# Patient Record
Sex: FEMALE | Race: BLACK OR AFRICAN AMERICAN | Employment: OTHER | URBAN - METROPOLITAN AREA
[De-identification: names, ages, dates, MRNs, and addresses within clinical notes are randomized per-mention and may not be internally consistent; named-entity substitution may affect disease eponyms.]

---

## 2017-12-28 ENCOUNTER — HOSPITAL ENCOUNTER (OUTPATIENT)
Dept: PHYSICAL THERAPY | Age: 67
Discharge: HOME | End: 2017-12-31

## 2017-12-28 ASSESSMENT — PAIN DESCRIPTION - PAIN TYPE: TYPE: ACUTE PAIN

## 2017-12-28 ASSESSMENT — PAIN DESCRIPTION - DESCRIPTORS: DESCRIPTORS: BURNING;ACHING;SHARP;SHOOTING

## 2017-12-28 ASSESSMENT — PAIN DESCRIPTION - LOCATION: LOCATION: HIP

## 2017-12-28 ASSESSMENT — PAIN DESCRIPTION - FREQUENCY: FREQUENCY: INTERMITTENT

## 2017-12-28 ASSESSMENT — PAIN SCALES - GENERAL: PAINLEVEL_OUTOF10: 9

## 2017-12-28 ASSESSMENT — PAIN DESCRIPTION - ORIENTATION: ORIENTATION: RIGHT

## 2017-12-28 NOTE — PROGRESS NOTES
Physical Therapy  Initial Assessment  Date: 2017  Patient Name: Tomeka Lo  MRN: [id number]  : 1950      Diagnosis: ITB Syndrome M76.31  Treatment Diagnosis: Reduced hip abd strength/stability RLE, ITB irritation, increased pain with prolonged standing     Restrictions    Outpatient fall risk assessment completed asking screening question if patient has fallen in the past 30 days:  [x] Yes  [] No    Based on screen for falls, patient demonstrates fall risk:  [] Yes  [x] No    Interventions based on fall risk status:  Updated Problem List within Medical History  [] Yes   [x] N/A    Asked family to assist with increased observation of the patient  [] Yes   [x] N/A    Patient kept in visible area when not closely supervised by therapist  [] Yes   [x] N/A    Repeatedly reinforce activity limits and safety needs with patient/family  [] Yes   [x] N/A    Increase frequency of rounding/monitoring patient  [] Yes   [x] N/A        Subjective   General  Chart Reviewed: Yes  Patient assessed for rehabilitation services?: Yes  Family / Caregiver Present: No  Referring Practitioner: Kimberlee Du  Referral Date : 17  Diagnosis: R ITB Syndrome m76.31  PT Visit Information  Onset Date: 17  PT Insurance Information: [employer] $45 copay, 60 visits, year, PRECERT required   Subjective  Subjective: CLOF: Pt has an episode with outer right leg pain for 6 month duration. Pt reports that she can't stand for long periods of time (approx 2 hours) to bake cookies, go to mall, etc, and she starts getting sharp, stabbing pains that requires her to sit down and rest for 15 mins, then she can stand for another approx 2 hours. Also gets pain with prolonged sitting. Rates pain at a 9/10 and describes pain as burning, sharp, shooting, deep, and pins and needles. During appt with Dr Joey Rader, she received NSAID's, and might receive a steroid injection. F/U with Dr Joey Rader next week.   Pt enjoys sewing and irritation, increased pain with prolonged standing   Prognosis: Good  Decision Making: Low Complexity    Plan   Plan  Times per week: 2  Times per day: Daily  Plan weeks: 6   Current Treatment Recommendations: ROM, Strengthening, Balance Training, Home Exercise Program, Manual Therapy - Soft Tissue Mobilization, Modalities, Patient/Caregiver Education & Training    G-Code  PT G-Codes  Functional Assessment Tool Used: PT Assessment   Score: 20-40% impaired   Functional Limitation: Mobility: Walking and moving around  Mobility: Walking and Moving Around Current Status ():  At least 20 percent but less than 40 percent impaired, limited or restricted  Mobility: Walking and Moving Around Goal Status (): 0 percent impaired, limited or restricted    OutComes Score  20-40% limited   Goals  Long term goals  Time Frame for Long term goals : 6  Long term goal 1: Pt will increase right hip abd/ext strength to 4+/5 to allow patient to improve tolerance to functional activities in home and resume all gym activities  Long term goal 2: Pt will reduce pain to <= 4/10 with prolonged standing > 2 hours to allow pt to go shopping at mall and do kitchen duties in home without difficulty   Long term goal 3: Pt will tolerate being able to stand for > 2 hours at a time without difficulty in order to maximize return to PLOF   Long term goal 4: Pt will be IND with progressive LE debility HEP program, including [facility], in order to optimize return to maximal functional mobility and independence  Patient Goals   Patient goals : Reduce pain, learn exercises to safely do to maintain        Therapy Time   Individual Concurrent Group Co-treatment   Time In  1600         Time Out  1652         Minutes  [address] [place] [address] Se, PT

## 2017-12-28 NOTE — FLOWSHEET NOTE
and seated Hip IR with green Tb. Issued green TB to patient     Manual Treatments:      Modalities:      Timed Code Treatment Minutes:  28    Total Treatment Minutes:  52    Treatment/Activity Tolerance:  [x] Patient tolerated treatment well [] Patient limited by fatigue  [] Patient limited by pain  [] Patient limited by other medical complications  [] Other:     Prognosis: [x] Good [] Fair  [] Poor    Patient Requires Follow-up: [x] Yes  [] No    Plan:   [] Continue per plan of care [] Alter current plan (see comments)  [x] Plan of care initiated [] Hold pending MD visit [] Discharge  Plan for Next Session:   ITB/hip ABD strengthening and flexibility.   Ultrasound/massage if appropriate     Electronically signed by:  Belgica Pedraza PT

## 2018-01-01 ENCOUNTER — HOSPITAL ENCOUNTER (OUTPATIENT)
Dept: OTHER | Age: 68
Discharge: HOME | End: 2018-01-31

## 2018-02-01 ENCOUNTER — HOSPITAL ENCOUNTER (OUTPATIENT)
Dept: OTHER | Age: 68
Discharge: HOME | End: 2018-02-28

## 2020-05-11 ENCOUNTER — OFFICE VISIT (OUTPATIENT)
Dept: ORTHOPEDIC SURGERY | Age: 70
End: 2020-05-11
Payer: COMMERCIAL

## 2020-05-11 VITALS — TEMPERATURE: 97.1 F

## 2020-05-11 PROCEDURE — 99202 OFFICE O/P NEW SF 15 MIN: CPT | Performed by: PHYSICIAN ASSISTANT

## 2020-05-11 PROCEDURE — 20610 DRAIN/INJ JOINT/BURSA W/O US: CPT | Performed by: PHYSICIAN ASSISTANT

## 2020-05-11 NOTE — PROGRESS NOTES
Subjective:      Patient ID: Maren Watts is a 78 y.o.  female. Chief Complaint   Patient presents with    Knee Problem     Left knee        HPI: She is here for evaluation and treatment of left knee pain related to arthritis. She states the previous injection which was performed 5 years ago  helped relieve the knee symptoms. The knee pain has gradually returned. There has not been a recent injury. Pain is 8/10. Pain is worse with activity. Pain improves somewhat with rest and elevation. It has been over a year since x-rays and clinical exam has been performed for knee arthritic complaints. Review of Systems:   Negative for fever or chills. Negative for numbness or tingling around the knee. Past Medical History:   Diagnosis Date    Anxiety     Baker cyst     Edema     Hyperlipidemia     Hypertension     Osteoarthritis     Osteopenia     Post herpetic neuralgia 2/25/2013    Thrush     Vitamin D deficiency        History reviewed. No pertinent family history. History reviewed. No pertinent surgical history.     Social History     Occupational History    Not on file   Tobacco Use    Smoking status: Never Smoker    Smokeless tobacco: Never Used   Substance and Sexual Activity    Alcohol use: No    Drug use: No    Sexual activity: Not on file       Current Outpatient Medications   Medication Sig Dispense Refill    hydrOXYzine (ATARAX) 25 MG tablet Take 1 tablet by mouth 2 times daily 60 tablet 2    vitamin D (ERGOCALCIFEROL) 11101 UNITS CAPS capsule Take 1 capsule by mouth once a week 4 capsule 2    amLODIPine (NORVASC) 5 MG tablet Take 1 tablet by mouth daily 30 tablet 2    potassium chloride SA (K-DUR;KLOR-CON M) 20 MEQ tablet Take 1 tablet by mouth daily 30 tablet 2    atenolol-chlorthalidone (TENORETIC) 100-25 MG per tablet Take 1 tablet by mouth daily 30 tablet 2    cloNIDine (CATAPRES) 0.1 MG tablet Take 1 tablet by mouth 2 times daily 60 tablet 2    Calcium the pain related to the arthritic knee condition. And when the injections fail to control the symptoms then surgical correction should be considered. Risk and benefits of corticosteroid intra-articular injection was discussed today. All questions were answered to her satisfaction. She verbally consented to proceed with intra-articular injection today. Cortisone Injection                                                       PROCEDURE NOTE:     Pre op Diagnosis: Knee pain/ DJD left Knee     Post op Diagnosis: Same  With the patient's permission, her left knee was prepped  in standard sterile fashion with  Alcohol and 2 cc of 0.25% Marcaine and 1 cc of Kenalog 40 mg was injected into the left lateral compartment  without difficulty. The patient tolerated this well without difficulty. A band-aid was applied. The patient was advised to ice the knee for 15-20 minutes to relieve any injection site related pain. Continue with medications as previously prescribed. If diabetic, observe blood sugars for the next 24 to 48 hours. Contact PCP if they remain elevated. HEP instructed for Knee ROM  and Quad strengthening exercises. Activities as tolerated. Follow Up:   Call or return to clinic prn if these symptoms worsen or fail to improve as anticipated.

## 2020-05-28 ENCOUNTER — TELEPHONE (OUTPATIENT)
Dept: ORTHOPEDIC SURGERY | Age: 70
End: 2020-05-28

## 2020-05-28 ENCOUNTER — OFFICE VISIT (OUTPATIENT)
Dept: ORTHOPEDIC SURGERY | Age: 70
End: 2020-05-28
Payer: COMMERCIAL

## 2020-05-28 VITALS — WEIGHT: 218 LBS | RESPIRATION RATE: 16 BRPM | BODY MASS INDEX: 36.32 KG/M2 | HEIGHT: 65 IN | TEMPERATURE: 97 F

## 2020-05-28 PROBLEM — M17.12 ARTHRITIS OF LEFT KNEE: Status: ACTIVE | Noted: 2020-05-28

## 2020-05-28 PROCEDURE — 20610 DRAIN/INJ JOINT/BURSA W/O US: CPT | Performed by: PHYSICIAN ASSISTANT

## 2020-05-28 PROCEDURE — 99213 OFFICE O/P EST LOW 20 MIN: CPT | Performed by: PHYSICIAN ASSISTANT

## 2020-05-28 NOTE — PROGRESS NOTES
Subjective:      Patient ID: Gabby Whitaker is a 78 y.o.  female. Chief Complaint   Patient presents with    Knee Pain     left knee pain         HPI: She is here for follow-up left knee arthritis. Status post left knee cortisone injection 5/11/2020. She states minimal improvement with this particular injection. Previous injections more beneficial.  Complain about pain over the past bursal region today. Pain worse with standing and walking. Review of Systems:   Negative for fever or chills. Negative for fever or chills. Negative for numbness or tingling lower extremity. Past Medical History:   Diagnosis Date    Anxiety     Baker cyst     Edema     Hyperlipidemia     Hypertension     Osteoarthritis     Osteopenia     Post herpetic neuralgia 2/25/2013    Thrush     Vitamin D deficiency        No family history on file. No past surgical history on file. Social History     Occupational History    Not on file   Tobacco Use    Smoking status: Never Smoker    Smokeless tobacco: Never Used   Substance and Sexual Activity    Alcohol use: No    Drug use: No    Sexual activity: Not on file       Current Outpatient Medications   Medication Sig Dispense Refill    hydrOXYzine (ATARAX) 25 MG tablet Take 1 tablet by mouth 2 times daily 60 tablet 2    vitamin D (ERGOCALCIFEROL) 37078 UNITS CAPS capsule Take 1 capsule by mouth once a week 4 capsule 2    amLODIPine (NORVASC) 5 MG tablet Take 1 tablet by mouth daily 30 tablet 2    potassium chloride SA (K-DUR;KLOR-CON M) 20 MEQ tablet Take 1 tablet by mouth daily 30 tablet 2    atenolol-chlorthalidone (TENORETIC) 100-25 MG per tablet Take 1 tablet by mouth daily 30 tablet 2    cloNIDine (CATAPRES) 0.1 MG tablet Take 1 tablet by mouth 2 times daily 60 tablet 2    Calcium Carbonate-Vitamin D (OYSTER SHELL CALCIUM/D) 500-200 MG-UNIT TABS Take 1 tablet by mouth daily.  30 tablet 0     No current facility-administered medications for this

## 2020-06-11 ENCOUNTER — HOSPITAL ENCOUNTER (OUTPATIENT)
Dept: ULTRASOUND IMAGING | Age: 70
Discharge: HOME | End: 2020-06-11
Payer: COMMERCIAL

## 2020-06-11 PROCEDURE — 76882 US LMTD JT/FCL EVL NVASC XTR: CPT

## 2020-06-22 ENCOUNTER — HOSPITAL ENCOUNTER (OUTPATIENT)
Dept: GENERAL RADIOLOGY | Age: 70
Discharge: HOME | End: 2020-06-22
Payer: COMMERCIAL

## 2020-06-22 ENCOUNTER — HOSPITAL ENCOUNTER (OUTPATIENT)
Age: 70
Discharge: HOME | End: 2020-06-22
Payer: COMMERCIAL

## 2020-06-22 PROCEDURE — 73590 X-RAY EXAM OF LOWER LEG: CPT

## 2021-07-28 ENCOUNTER — OFFICE VISIT (OUTPATIENT)
Dept: ORTHOPEDIC SURGERY | Age: 71
End: 2021-07-28
Payer: COMMERCIAL

## 2021-07-28 VITALS
DIASTOLIC BLOOD PRESSURE: 82 MMHG | BODY MASS INDEX: 36.32 KG/M2 | HEART RATE: 77 BPM | SYSTOLIC BLOOD PRESSURE: 140 MMHG | HEIGHT: 65 IN | WEIGHT: 218 LBS

## 2021-07-28 DIAGNOSIS — M17.12 PRIMARY OSTEOARTHRITIS OF LEFT KNEE: Primary | ICD-10-CM

## 2021-07-28 PROCEDURE — 20610 DRAIN/INJ JOINT/BURSA W/O US: CPT | Performed by: PHYSICIAN ASSISTANT

## 2021-07-28 PROCEDURE — 99213 OFFICE O/P EST LOW 20 MIN: CPT | Performed by: PHYSICIAN ASSISTANT

## 2021-07-28 NOTE — PROGRESS NOTES
Subjective:      Patient ID: Elise Palomino is a 66 y.o.  female. Chief Complaint   Patient presents with    Follow-up     Left knee         HPI: She is here for evaluation and treatment of left knee pain related to arthritis. She states the previous injection which was performed 5/28/2021  helped relieve the knee symptoms. The knee pain has gradually returned. There has not been a recent injury. Recently started exercising on a treadmill. Pain is 9/10. Pain is worse with activity. Pain improves somewhat with rest and elevation. It has been over a year since x-rays and clinical exam has been performed for knee arthritic complaints. Review of Systems:   Negative for fever or chills. Negative for numbness or tingling around the knee. Past Medical History:   Diagnosis Date    Anxiety     Baker cyst     Edema     Hyperlipidemia     Hypertension     Osteoarthritis     Osteopenia     Post herpetic neuralgia 2/25/2013    Thrush     Vitamin D deficiency        History reviewed. No pertinent family history. History reviewed. No pertinent surgical history.     Social History     Occupational History    Not on file   Tobacco Use    Smoking status: Never Smoker    Smokeless tobacco: Never Used   Vaping Use    Vaping Use: Never used   Substance and Sexual Activity    Alcohol use: No    Drug use: No    Sexual activity: Not on file       Current Outpatient Medications   Medication Sig Dispense Refill    hydrOXYzine (ATARAX) 25 MG tablet Take 1 tablet by mouth 2 times daily 60 tablet 2    vitamin D (ERGOCALCIFEROL) 39978 UNITS CAPS capsule Take 1 capsule by mouth once a week 4 capsule 2    amLODIPine (NORVASC) 5 MG tablet Take 1 tablet by mouth daily 30 tablet 2    potassium chloride SA (K-DUR;KLOR-CON M) 20 MEQ tablet Take 1 tablet by mouth daily 30 tablet 2    atenolol-chlorthalidone (TENORETIC) 100-25 MG per tablet Take 1 tablet by mouth daily 30 tablet 2    cloNIDine (CATAPRES) 0.1 MG tablet Take 1 tablet by mouth 2 times daily 60 tablet 2    Calcium Carbonate-Vitamin D (OYSTER SHELL CALCIUM/D) 500-200 MG-UNIT TABS Take 1 tablet by mouth daily. 30 tablet 0     No current facility-administered medications for this visit. Objective:   She is alert, oriented x 3, pleasant, well nourished, developed and in no acute distress. BP (!) 140/82   Pulse 77   Ht 5' 5\" (1.651 m)   Wt 218 lb (98.9 kg)   BMI 36.28 kg/m²        Examination of the left knee shows: There is not erythema. ROM- decreased range of motion is noted. There is mild to moderate pain associated with ROM testing. Extensor Mechanism is  intact. Examination of the lower extremities are intact with sensation to light touch. Motor testing  5/5 in all major motor groups of the lower extremities. Gait is normal heel to toe. Gait is antalgic. Negative Cai's Sign. SLR negative. Intact perfusion to both lower extremities. No cyanosis. Digits are warm to touch, capillary refill is less than 2 seconds. no edema noted. The skin to be intact without lacerations or abrasions. No significant erythema. No rashes or skin lesions. X Rays: was performed in the office today:   AP Standing, Lateral and Sunrise views of left knee:   No acute fractures or dislocations noted. Knee x-rays demonstrate osteoarthritis. Medial compartment-    3/4 Kellgren and Kevin Classification. Lateral compartment-    1/4 Kellgren and Kevin Classification. PF compartment-          3/4 Kellgren and Kevin Classification. Assessment:       ICD-10-CM    1. Primary osteoarthritis of left knee  M17.12 XR KNEE LEFT (3 VIEWS)     SD ARTHROCENTESIS ASPIR&/INJ MAJOR JT/BURSA W/O US     SD TRIAMCINOLONE ACETONIDE INJ        Plan:     Assessment:  Moderate to advanced arthritis of the medial and patellofemoral compartments of the left knee.  Symptoms are not severe enough to warrant knee arthroplasty at this time. I had an extensive discussion with Ms. Edwar Leos and/or family regarding the natural history, etiology, and long term consequences of her condition. I have presented reasonable alternatives to the patient's proposed care, treatment, and services. Risks and benefits of the treatment options also reviewed in detail. I have outlined a treatment plan with them. She has had full opportunity to ask her questions. I have answered them all to her satisfaction. I feel that Ms. Edwar Leos understands our discussion today     Plan:  Medications-Tylenol for pain. PT- A home exercise program was instructed today including ROM exercises and strengthening exercises. The patient verbalized understanding of these exercises as well as the importance of the exercise program to promote return of normal function. If pain intensifies or other problems arise you are to notify the office. Further Imaging- none    Procedures- Risk and benefits of corticosteroid intra-articular injection was discussed today. All questions were answered to her satisfaction. She verbally consented to proceed with intra-articular injection today. Cortisone Injection                                                       PROCEDURE NOTE:     Pre op Diagnosis:  left knee pain     Post op Diagnosis: Same  With the Edwar Leos permission, her left knee was prepped  in standard sterile fashion with  Alcohol and 2 cc of 0.25% Marcaine and 1 cc of Kenalog 40 mg was injected into the left lateral compartment  without difficulty. The patient tolerated this well without difficulty. A band-aid was applied. The patient was advised to ice the knee for 15-20 minutes to relieve any injection site related pain. Follow up- 3-4 months. Call or return to clinic if these symptoms worsen or fail to improve as anticipated.                                                  Matthews Cheadle PA-C Senior Physician [id number] [facility]                                         Disclaimer: This note was generated with use of a verbal recognition program (DRAGON) and an attempt was made to check for errors. It is possible that there are still dictated errors within this office note. If so, please bring any significant errors to my attention for an addendum. All efforts were made to ensure that this office note is accurate.

## 2023-06-26 ENCOUNTER — HOSPITAL ENCOUNTER (OUTPATIENT)
Age: 73
Discharge: HOME | End: 2023-06-26
Payer: COMMERCIAL

## 2023-06-26 LAB
ALBUMIN SERPL-MCNC: 4.1 G/DL (ref 3.4–5)
ALBUMIN/GLOB SERPL: 1.2 {RATIO} (ref 1.1–2.2)
ALP SERPL-CCNC: 100 U/L (ref 40–129)
ALT SERPL-CCNC: 21 U/L (ref 10–40)
ANION GAP SERPL CALCULATED.3IONS-SCNC: 11 MMOL/L (ref 3–16)
AST SERPL-CCNC: 25 U/L (ref 15–37)
BASOPHILS # BLD: 0 K/UL (ref 0–0.2)
BASOPHILS NFR BLD: 0.9 %
BILIRUB SERPL-MCNC: 0.4 MG/DL (ref 0–1)
BUN SERPL-MCNC: 22 MG/DL (ref 7–20)
CALCIUM SERPL-MCNC: 9.3 MG/DL (ref 8.3–10.6)
CHLORIDE SERPL-SCNC: 100 MMOL/L (ref 99–110)
CHOLEST SERPL-MCNC: 183 MG/DL (ref 0–199)
CO2 SERPL-SCNC: 26 MMOL/L (ref 21–32)
CREAT SERPL-MCNC: 1.4 MG/DL (ref 0.6–1.2)
DEPRECATED RDW RBC AUTO: 13.4 % (ref 12.4–15.4)
EOSINOPHIL # BLD: 0.1 K/UL (ref 0–0.6)
EOSINOPHIL NFR BLD: 2.5 %
GFR SERPLBLD CREATININE-BSD FMLA CKD-EPI: 39 ML/MIN/{1.73_M2}
GLUCOSE SERPL-MCNC: 107 MG/DL (ref 70–99)
HCT VFR BLD AUTO: 36.2 % (ref 36–48)
HDLC SERPL-MCNC: 85 MG/DL (ref 40–60)
HGB BLD-MCNC: 12 G/DL (ref 12–16)
LDLC SERPL CALC-MCNC: 89 MG/DL
LYMPHOCYTES # BLD: 1.7 K/UL (ref 1–5.1)
LYMPHOCYTES NFR BLD: 31.5 %
MCH RBC QN AUTO: 31.5 PG (ref 26–34)
MCHC RBC AUTO-ENTMCNC: 33.2 G/DL (ref 31–36)
MCV RBC AUTO: 94.9 FL (ref 80–100)
MONOCYTES # BLD: 0.4 K/UL (ref 0–1.3)
MONOCYTES NFR BLD: 8.1 %
NEUTROPHILS # BLD: 3.1 K/UL (ref 1.7–7.7)
NEUTROPHILS NFR BLD: 57 %
PLATELET # BLD AUTO: 189 K/UL (ref 135–450)
PMV BLD AUTO: 9.9 FL (ref 5–10.5)
POTASSIUM SERPL-SCNC: 3.5 MMOL/L (ref 3.5–5.1)
PROT SERPL-MCNC: 7.4 G/DL (ref 6.4–8.2)
RBC # BLD AUTO: 3.81 M/UL (ref 4–5.2)
SODIUM SERPL-SCNC: 137 MMOL/L (ref 136–145)
TRIGL SERPL-MCNC: 47 MG/DL (ref 0–150)
TSH SERPL DL<=0.005 MIU/L-ACNC: 2.14 UIU/ML (ref 0.27–4.2)
VLDLC SERPL CALC-MCNC: 9 MG/DL
WBC # BLD AUTO: 5.5 K/UL (ref 4–11)

## 2023-06-26 PROCEDURE — 82306 VITAMIN D 25 HYDROXY: CPT

## 2023-06-26 PROCEDURE — 85025 COMPLETE CBC W/AUTO DIFF WBC: CPT

## 2023-06-26 PROCEDURE — 80061 LIPID PANEL: CPT

## 2023-06-26 PROCEDURE — 80053 COMPREHEN METABOLIC PANEL: CPT

## 2023-06-26 PROCEDURE — 84443 ASSAY THYROID STIM HORMONE: CPT

## 2023-06-26 PROCEDURE — 36415 COLL VENOUS BLD VENIPUNCTURE: CPT

## 2023-06-27 LAB — 25(OH)D3 SERPL-MCNC: 50 NG/ML

## 2025-05-13 ENCOUNTER — OFFICE VISIT (OUTPATIENT)
Dept: ORTHOPEDIC SURGERY | Age: 75
End: 2025-05-13
Payer: MEDICARE

## 2025-05-13 VITALS — BODY MASS INDEX: 36.32 KG/M2 | HEIGHT: 65 IN | WEIGHT: 218 LBS

## 2025-05-13 DIAGNOSIS — M17.0 PRIMARY OSTEOARTHRITIS OF BOTH KNEES: ICD-10-CM

## 2025-05-13 DIAGNOSIS — M17.12 PRIMARY OSTEOARTHRITIS OF LEFT KNEE: Primary | ICD-10-CM

## 2025-05-13 DIAGNOSIS — G89.29 CHRONIC PAIN OF RIGHT KNEE: ICD-10-CM

## 2025-05-13 DIAGNOSIS — M25.561 CHRONIC PAIN OF RIGHT KNEE: ICD-10-CM

## 2025-05-13 PROCEDURE — 1159F MED LIST DOCD IN RCRD: CPT | Performed by: PHYSICIAN ASSISTANT

## 2025-05-13 PROCEDURE — 99213 OFFICE O/P EST LOW 20 MIN: CPT | Performed by: PHYSICIAN ASSISTANT

## 2025-05-13 PROCEDURE — 1125F AMNT PAIN NOTED PAIN PRSNT: CPT | Performed by: PHYSICIAN ASSISTANT

## 2025-05-13 PROCEDURE — 1123F ACP DISCUSS/DSCN MKR DOCD: CPT | Performed by: PHYSICIAN ASSISTANT

## 2025-05-13 PROCEDURE — 20610 DRAIN/INJ JOINT/BURSA W/O US: CPT | Performed by: PHYSICIAN ASSISTANT

## 2025-05-13 RX ORDER — BUPIVACAINE HYDROCHLORIDE 2.5 MG/ML
2 INJECTION, SOLUTION INFILTRATION; PERINEURAL ONCE
Status: COMPLETED | OUTPATIENT
Start: 2025-05-13 | End: 2025-05-13

## 2025-05-13 RX ORDER — TRIAMCINOLONE ACETONIDE 40 MG/ML
40 INJECTION, SUSPENSION INTRA-ARTICULAR; INTRAMUSCULAR ONCE
Status: COMPLETED | OUTPATIENT
Start: 2025-05-13 | End: 2025-05-13

## 2025-05-13 RX ADMIN — TRIAMCINOLONE ACETONIDE 40 MG: 40 INJECTION, SUSPENSION INTRA-ARTICULAR; INTRAMUSCULAR at 10:08

## 2025-05-13 RX ADMIN — BUPIVACAINE HYDROCHLORIDE 5 MG: 2.5 INJECTION, SOLUTION INFILTRATION; PERINEURAL at 10:08

## 2025-05-13 NOTE — PROGRESS NOTES
Subjective:      Patient ID: Kari Yadav is a 74 y.o.  female.  HPI:   She is here for evaluation and treatment of left knee pain related to arthritis.   She states the previous injection which was performed 6/15/2023  helped relieve the knee symptoms.    The knee pain has gradually returned.   There has been a recent injury.   She states in early March while going into a store, she had rubber boots on and stubbed her foot which caused her to fall forward on both the left or right knees.  She did sustain a superficial abrasion to her right knee.  She did not have any significant pain after a few days.  She recently went on vacation 1 week ago and did a lot of stairs while sightseeing.  She is now having pain in both left and right knee.  Pain is 8/10. Pain is worse with activity/ steps.   Pain improves somewhat with rest and elevation.     It has been over a year since x-rays and clinical exam has been performed for right and left knee arthritic complaints.      Review of Systems:   Negative for fever or chills.  Negative for numbness or tingling around the knee.    Past Medical History:   Diagnosis Date    Anxiety     Baker cyst     Edema     Hyperlipidemia     Hypertension     Osteoarthritis     Osteopenia     Post herpetic neuralgia 2/25/2013    Thrush     Vitamin D deficiency        History reviewed. No pertinent family history.    History reviewed. No pertinent surgical history.    Social History     Occupational History    Not on file   Tobacco Use    Smoking status: Never    Smokeless tobacco: Never   Vaping Use    Vaping status: Never Used   Substance and Sexual Activity    Alcohol use: No    Drug use: No    Sexual activity: Not on file       Current Outpatient Medications   Medication Sig Dispense Refill    hydrOXYzine (ATARAX) 25 MG tablet Take 1 tablet by mouth 2 times daily 60 tablet 2    vitamin D (ERGOCALCIFEROL) 98214 UNITS CAPS capsule Take 1 capsule by mouth once a week 4 capsule 2

## 2025-08-13 ENCOUNTER — HOSPITAL ENCOUNTER (OUTPATIENT)
Dept: PHYSICAL THERAPY | Age: 75
Setting detail: THERAPIES SERIES
Discharge: HOME | End: 2025-08-13
Payer: MEDICARE

## 2025-08-13 DIAGNOSIS — M24.80 GENERALIZED ARTICULAR HYPERMOBILITY: ICD-10-CM

## 2025-08-13 DIAGNOSIS — M79.605 LEFT LEG PAIN: ICD-10-CM

## 2025-08-13 DIAGNOSIS — M62.81 MUSCLE WEAKNESS (GENERALIZED): ICD-10-CM

## 2025-08-13 DIAGNOSIS — S76.312D STRAIN OF LEFT HAMSTRING, SUBSEQUENT ENCOUNTER: Primary | ICD-10-CM

## 2025-08-13 PROCEDURE — 97530 THERAPEUTIC ACTIVITIES: CPT

## 2025-08-13 PROCEDURE — 97110 THERAPEUTIC EXERCISES: CPT

## 2025-08-13 PROCEDURE — 97161 PT EVAL LOW COMPLEX 20 MIN: CPT

## 2025-08-20 ENCOUNTER — APPOINTMENT (OUTPATIENT)
Dept: PHYSICAL THERAPY | Age: 75
End: 2025-08-20
Payer: MEDICARE

## 2025-08-22 ENCOUNTER — HOSPITAL ENCOUNTER (OUTPATIENT)
Dept: PHYSICAL THERAPY | Age: 75
Setting detail: THERAPIES SERIES
End: 2025-08-22
Payer: MEDICARE

## 2025-08-27 ENCOUNTER — APPOINTMENT (OUTPATIENT)
Dept: PHYSICAL THERAPY | Age: 75
End: 2025-08-27
Payer: MEDICARE

## 2025-08-29 ENCOUNTER — APPOINTMENT (OUTPATIENT)
Dept: PHYSICAL THERAPY | Age: 75
End: 2025-08-29
Payer: MEDICARE

## 2025-09-05 ENCOUNTER — HOSPITAL ENCOUNTER (OUTPATIENT)
Dept: CT IMAGING | Age: 75
Discharge: HOME | End: 2025-09-05
Payer: MEDICARE

## 2025-09-05 DIAGNOSIS — H70.93 MASTOIDITIS OF BOTH SIDES: ICD-10-CM

## 2025-09-05 PROCEDURE — 70480 CT ORBIT/EAR/FOSSA W/O DYE: CPT

## 2025-09-05 PROCEDURE — 70450 CT HEAD/BRAIN W/O DYE: CPT
